# Patient Record
Sex: FEMALE | URBAN - METROPOLITAN AREA
[De-identification: names, ages, dates, MRNs, and addresses within clinical notes are randomized per-mention and may not be internally consistent; named-entity substitution may affect disease eponyms.]

---

## 2023-06-30 ENCOUNTER — APPOINTMENT (OUTPATIENT)
Dept: URBAN - METROPOLITAN AREA SURGERY 20 | Age: 38
Setting detail: DERMATOLOGY
End: 2023-06-30

## 2023-06-30 VITALS — TEMPERATURE: 95.9 F | HEART RATE: 63 BPM | SYSTOLIC BLOOD PRESSURE: 109 MMHG | DIASTOLIC BLOOD PRESSURE: 68 MMHG

## 2023-06-30 VITALS — DIASTOLIC BLOOD PRESSURE: 69 MMHG | HEART RATE: 76 BPM | TEMPERATURE: 95.9 F | SYSTOLIC BLOOD PRESSURE: 105 MMHG

## 2023-06-30 DIAGNOSIS — I87.2 VENOUS INSUFFICIENCY (CHRONIC) (PERIPHERAL): ICD-10-CM

## 2023-06-30 PROBLEM — C43.31 MALIGNANT MELANOMA OF NOSE: Status: ACTIVE | Noted: 2023-06-30

## 2023-06-30 PROBLEM — C43.72 MALIGNANT MELANOMA OF LEFT LOWER LIMB, INCLUDING HIP: Status: ACTIVE | Noted: 2023-06-30

## 2023-06-30 PROCEDURE — 12032 INTMD RPR S/A/T/EXT 2.6-7.5: CPT | Mod: 59

## 2023-06-30 PROCEDURE — OTHER MIPS QUALITY: OTHER

## 2023-06-30 PROCEDURE — OTHER MOHS SURGERY: OTHER

## 2023-06-30 PROCEDURE — 17313 MOHS 1 STAGE T/A/L: CPT

## 2023-06-30 PROCEDURE — 29580 STRAPPING UNNA BOOT: CPT | Mod: LT

## 2023-06-30 PROCEDURE — 88342 IMHCHEM/IMCYTCHM 1ST ANTB: CPT | Mod: 59

## 2023-06-30 PROCEDURE — OTHER ADDITIONAL PATHOLOGY SPECIMEN: OTHER

## 2023-06-30 PROCEDURE — OTHER UNNA BOOT APPLICATION: OTHER

## 2023-06-30 NOTE — PROCEDURE: MOHS SURGERY
Refill request for citalopram (CELEXA) 20 MG tablet.  Prescription last refill - 3/6/2020 for 90 tabs w/0 refills  LOV - 4/8/2020  Next appointment - none    Refill will need MD approval per refill protocol.  Forwarded to Dr. Harris, please advise on refill request.  Thank you.'   Localized Dermabrasion With Wire Brush Text: The patient was draped in routine manner.  Localized dermabrasion using sterilized sandpaper was performed in routine manner to papillary dermis. This spot dermabrasion is being performed to complete skin cancer reconstruction. It also will eliminate the other sun damaged precancerous cells that are known to be part of the regional effect of a lifetime's worth of sun exposure. This localized dermabrasion is therapeutic and should not be considered cosmetic in any regard. Localized Dermabrasion Text: The patient was draped in routine manner.  Localized dermabrasion using sterilized sandpaper was performed in routine manner to papillary dermis. This spot dermabrasion is being performed to complete skin cancer reconstruction. It also will eliminate the other sun damaged precancerous cells that are known to be part of the regional effect of a lifetime's worth of sun exposure. This localized dermabrasion is therapeutic and should not be considered cosmetic in any regard.

## 2023-06-30 NOTE — PROCEDURE: UNNA BOOT APPLICATION
Removal Of Previous Unna Boot (No) Text: The site was cleaned in preparation for an Unna Boot application.
Zinc Paste Impregnated Bandage Units: 1
Removal Of Previous Unna Boot (Yes) Text: The previous Unna Boot was removed and then the site was cleaned in preparation for another Unna Boot application.
Bill For Unna Boot Supplies?: No
Location Applied: the left leg
Detail Level: Zone
Additional Instructions: Return in one week for Unna boot change or remove at home in one week
Indication: post-operative immobilization
After Unna Boot Application Text: Coban was used to wrap the outside of Unna Boot and we ensured the Unna Boot wasn't too tight prior to the patient leaving the office.
Medication Occluded Under Unnaboot: Aquaphor

## 2023-06-30 NOTE — PROCEDURE: ADDITIONAL PATHOLOGY SPECIMEN
Path Notes (To The Dermatopathologist): Immuno Mohs debulk submitted for MM.\\nPrevious biopsy #:-[id number] Path Notes (To The Dermatopathologist): Immuno Mohs debulk submitted for MM.\\nPrevious biopsy #:-[id number]

## 2023-06-30 NOTE — PROCEDURE: MOHS SURGERY
Stage 12: Additional Anesthesia Type: 1% lidocaine with 1:100,000 epinephrine and 408mcg clindamycin/ml and a 1:10 solution of 8.4% sodium bicarbonate

## 2023-06-30 NOTE — PROCEDURE: MOHS SURGERY
Stage 1 Add-On Histology Text: One or more of the reagents used in the immunohistochemical testing in this case may not have been cleared or approved by the [place] FDA.  The FDA has determined that such clearance or approval is not necessary.  These tests are used for clinical purposes.  They should not be regarded as investigational or for research.  These reagents' performance characterstics have been determined by [facility].  This laboratory is certified under the Clinical Laboratory Improvement Amendments of 1988 as qualified to perform high complexity clinical laboratory testing.

## 2023-06-30 NOTE — PROCEDURE: MIPS QUALITY
Quality 226: Preventive Care And Screening: Tobacco Use: Screening And Cessation Intervention: Patient screened for tobacco use and is an ex/non-smoker
Quality 110: Preventive Care And Screening: Influenza Immunization: Influenza Immunization previously received during influenza season
Quality 400a: One-Time Screening For Hepatitis C Virus (Hcv) For All Patients: Patient received one-time screening for HCV infection
Quality 130: Documentation Of Current Medications In The Medical Record: Current Medications Documented
Detail Level: Detailed

## 2023-06-30 NOTE — PROCEDURE: MOHS SURGERY
Error. Paramedian Forehead Flap Text: A decision was made to reconstruct the defect utilizing an interpolation axial flap and a staged reconstruction.  A telfa template was made of the defect.  This telfa template was then used to outline the paramedian forehead pedicle flap.  The donor area for the pedicle flap was then injected with anesthesia.  The flap was excised through the skin and subcutaneous tissue down to the layer of the underlying musculature.  The pedicle flap was carefully excised within this deep plane to maintain its blood supply.  The edges of the donor site were undermined.   The donor site was closed in a primary fashion.  The pedicle was then rotated into position and sutured.  Once the tube was sutured into place, adequate blood supply was confirmed with blanching and refill.  The pedicle was then wrapped with xeroform gauze and dressed appropriately with a telfa and gauze bandage to ensure continued blood supply and protect the attached pedicle.
